# Patient Record
Sex: MALE | Race: BLACK OR AFRICAN AMERICAN
[De-identification: names, ages, dates, MRNs, and addresses within clinical notes are randomized per-mention and may not be internally consistent; named-entity substitution may affect disease eponyms.]

---

## 2018-02-24 ENCOUNTER — HOSPITAL ENCOUNTER (EMERGENCY)
Dept: HOSPITAL 72 - EMR | Age: 58
Discharge: HOME | End: 2018-02-24
Payer: COMMERCIAL

## 2018-02-24 VITALS — SYSTOLIC BLOOD PRESSURE: 131 MMHG | DIASTOLIC BLOOD PRESSURE: 77 MMHG

## 2018-02-24 VITALS — SYSTOLIC BLOOD PRESSURE: 124 MMHG | DIASTOLIC BLOOD PRESSURE: 80 MMHG

## 2018-02-24 VITALS — SYSTOLIC BLOOD PRESSURE: 129 MMHG | DIASTOLIC BLOOD PRESSURE: 79 MMHG

## 2018-02-24 VITALS — WEIGHT: 160 LBS | BODY MASS INDEX: 24.25 KG/M2 | HEIGHT: 68 IN

## 2018-02-24 DIAGNOSIS — K21.9: ICD-10-CM

## 2018-02-24 DIAGNOSIS — M10.9: ICD-10-CM

## 2018-02-24 DIAGNOSIS — R10.13: Primary | ICD-10-CM

## 2018-02-24 LAB
ADD MANUAL DIFF: NO
ALBUMIN SERPL-MCNC: 3.3 G/DL (ref 3.4–5)
ALBUMIN/GLOB SERPL: 0.8 {RATIO} (ref 1–2.7)
ALP SERPL-CCNC: 62 U/L (ref 46–116)
ALT SERPL-CCNC: 23 U/L (ref 12–78)
ANION GAP SERPL CALC-SCNC: 8 MMOL/L (ref 5–15)
APPEARANCE UR: CLEAR
APTT BLD: 27 SEC (ref 23–33)
APTT PPP: (no result) S
AST SERPL-CCNC: 14 U/L (ref 15–37)
BASOPHILS NFR BLD AUTO: 1.1 % (ref 0–2)
BILIRUB SERPL-MCNC: 0.7 MG/DL (ref 0.2–1)
BUN SERPL-MCNC: 14 MG/DL (ref 7–18)
CALCIUM SERPL-MCNC: 9.2 MG/DL (ref 8.5–10.1)
CHLORIDE SERPL-SCNC: 107 MMOL/L (ref 98–107)
CK SERPL-CCNC: 111 U/L (ref 26–308)
CO2 SERPL-SCNC: 25 MMOL/L (ref 21–32)
CREAT SERPL-MCNC: 1.3 MG/DL (ref 0.55–1.3)
EOSINOPHIL NFR BLD AUTO: 1.8 % (ref 0–3)
ERYTHROCYTE [DISTWIDTH] IN BLOOD BY AUTOMATED COUNT: 11.5 % (ref 11.6–14.8)
GLOBULIN SER-MCNC: 4.2 G/DL
GLUCOSE UR STRIP-MCNC: NEGATIVE MG/DL
HCT VFR BLD CALC: 46.4 % (ref 42–52)
HGB BLD-MCNC: 15.8 G/DL (ref 14.2–18)
INR PPP: 1 (ref 0.9–1.1)
KETONES UR QL STRIP: NEGATIVE
LEUKOCYTE ESTERASE UR QL STRIP: NEGATIVE
LYMPHOCYTES NFR BLD AUTO: 50.4 % (ref 20–45)
MCV RBC AUTO: 92 FL (ref 80–99)
MONOCYTES NFR BLD AUTO: 3 % (ref 1–10)
NEUTROPHILS NFR BLD AUTO: 43.7 % (ref 45–75)
NITRITE UR QL STRIP: NEGATIVE
PH UR STRIP: 5 [PH] (ref 4.5–8)
PLATELET # BLD: 205 K/UL (ref 150–450)
POTASSIUM SERPL-SCNC: 3.6 MMOL/L (ref 3.5–5.1)
PROT UR QL STRIP: NEGATIVE
RBC # BLD AUTO: 5.02 M/UL (ref 4.7–6.1)
SODIUM SERPL-SCNC: 140 MMOL/L (ref 136–145)
SP GR UR STRIP: 1.01 (ref 1–1.03)
UROBILINOGEN UR-MCNC: NORMAL MG/DL (ref 0–1)
WBC # BLD AUTO: 6.1 K/UL (ref 4.8–10.8)

## 2018-02-24 PROCEDURE — 81003 URINALYSIS AUTO W/O SCOPE: CPT

## 2018-02-24 PROCEDURE — 83690 ASSAY OF LIPASE: CPT

## 2018-02-24 PROCEDURE — 71045 X-RAY EXAM CHEST 1 VIEW: CPT

## 2018-02-24 PROCEDURE — 99284 EMERGENCY DEPT VISIT MOD MDM: CPT

## 2018-02-24 PROCEDURE — 82550 ASSAY OF CK (CPK): CPT

## 2018-02-24 PROCEDURE — 85610 PROTHROMBIN TIME: CPT

## 2018-02-24 PROCEDURE — 93005 ELECTROCARDIOGRAM TRACING: CPT

## 2018-02-24 PROCEDURE — 85025 COMPLETE CBC W/AUTO DIFF WBC: CPT

## 2018-02-24 PROCEDURE — 80053 COMPREHEN METABOLIC PANEL: CPT

## 2018-02-24 PROCEDURE — 96375 TX/PRO/DX INJ NEW DRUG ADDON: CPT

## 2018-02-24 PROCEDURE — 96374 THER/PROPH/DIAG INJ IV PUSH: CPT

## 2018-02-24 PROCEDURE — 36415 COLL VENOUS BLD VENIPUNCTURE: CPT

## 2018-02-24 PROCEDURE — 85730 THROMBOPLASTIN TIME PARTIAL: CPT

## 2018-02-24 PROCEDURE — 74018 RADEX ABDOMEN 1 VIEW: CPT

## 2018-02-24 PROCEDURE — 84484 ASSAY OF TROPONIN QUANT: CPT

## 2018-02-24 NOTE — EMERGENCY ROOM REPORT
History of Present Illness


General


Chief Complaint:  Abdominal Pain


Source:  Patient, Family Member, EMS





Present Illness


HPI


The patient presents with epigastric pain which began this morning.  This is 

severe - rated 6/10, constant.  Denies alcohol and nonsteroidal anti-

inflammatories.  He does take medication for gout and supposed to take medicine 

for GERD.  Denies any vomiting or melena.  The pain does not radiate to his 

back.  It is epigastric.  Burning pain severe pressure.  Denies fever.





GERD and Gout





Not compliant with omeperazole.  No endoscopy.





No chest pain, cough, dysuria, headache, rashes, change in bowels, joint pain (

gout stable).





No ill contacts.


Allergies:  


Coded Allergies:  


     No Known Allergies (Unverified , 2/24/18)





Patient History


Past Medical History:  see triage record


Social History:  Reports: drug use, Denies: smoking, alcohol use


Social History Narrative





Reviewed Nursing Documentation:  PMH: Agreed, PSxH: Agreed





Nursing Documentation-PMH


Hx Gastrointestinal Problems:  Yes - gastritis





Review of Systems


All Other Systems:  negative except mentioned in HPI





Physical Exam





Vital Signs








  Date Time  Temp Pulse Resp B/P (MAP) Pulse Ox O2 Delivery O2 Flow Rate FiO2


 


2/24/18 11:59 98.0 88 16 124/80 98 Room Air  





 98.1       








Sp02 EP Interpretation:  reviewed, normal


General Appearance:  well appearing, GCS 15, mild distress


Head:  normocephalic, atraumatic


Eyes:  bilateral eye normal inspection, bilateral eye PERRL


ENT:  moist mucus membranes


Neck:  supple


Respiratory:  lungs clear, normal breath sounds


Cardiovascular #1:  regular rate, rhythm


Cardiovascular #2:  2+ radial (R)


Gastrointestinal:  normal inspection, normal bowel sounds, no mass, non-

distended, no guarding, no rebound, tenderness - epigastric


Genitourinary:  no CVA tenderness


Musculoskeletal:  back normal, gait/station normal, normal range of motion


Neurologic:  alert, oriented x3, grossly normal


Psychiatric:  anxious - and in pain (looks worse than6/10)


Skin:  normal inspection, warm/dry





Medical Decision Making


Diagnostic Impression:  


 Primary Impression:  


 Epigastric pain


ER Course


Patient with h/o GERD presents with epigastric pain which is severe.  DDx: 

gastritis, AMI, PUD, pancreatitis, diverticultis amongst others.  Exam against 

PE.  Evaluation with EKG, CXR, Abd films, labs.  Treatment with IV hydration, 

zofran, pepcid and morphine.





EKG without injury, CXR normal, Abd, normal.  Labs with normal WBC, H/H, CMP.





Still with pain after initial dosing - though resting calmly.  Dilaudid ordered.





Significant relief with improved clinical exam (though rated pain same).





Discussed need for follow up with PMD (sooner than appointment).  Also of need 

for GI referral.  Also discussed need for daily omeprazole.





.Patient stable for outpatient observation and treatment.





Laboratory Tests








Test


  2/24/18


12:15 2/24/18


13:04


 


White Blood Count


  6.1 K/UL


(4.8-10.8) 


 


 


Red Blood Count


  5.02 M/UL


(4.70-6.10) 


 


 


Hemoglobin


  15.8 G/DL


(14.2-18.0) 


 


 


Hematocrit


  46.4 %


(42.0-52.0) 


 


 


Mean Corpuscular Volume 92 FL (80-99)   


 


Mean Corpuscular Hemoglobin


  31.5 PG


(27.0-31.0)  H 


 


 


Mean Corpuscular Hemoglobin


Concent 34.2 G/DL


(32.0-36.0) 


 


 


Red Cell Distribution Width


  11.5 %


(11.6-14.8)  L 


 


 


Platelet Count


  205 K/UL


(150-450) 


 


 


Mean Platelet Volume


  6.0 FL


(6.5-10.1)  L 


 


 


Neutrophils (%) (Auto)


  43.7 %


(45.0-75.0)  L 


 


 


Lymphocytes (%) (Auto)


  50.4 %


(20.0-45.0)  H 


 


 


Monocytes (%) (Auto)


  3.0 %


(1.0-10.0) 


 


 


Eosinophils (%) (Auto)


  1.8 %


(0.0-3.0) 


 


 


Basophils (%) (Auto)


  1.1 %


(0.0-2.0) 


 


 


Prothrombin Time


  10.0 SEC


(9.30-11.50) 


 


 


Prothrombin Time INR 1.0 (0.9-1.1)   


 


PTT


  27 SEC (23-33)


  


 


 


Sodium Level


  140 MMOL/L


(136-145) 


 


 


Potassium Level


  3.6 MMOL/L


(3.5-5.1) 


 


 


Chloride Level


  107 MMOL/L


() 


 


 


Carbon Dioxide Level


  25 MMOL/L


(21-32) 


 


 


Anion Gap


  8 mmol/L


(5-15) 


 


 


Blood Urea Nitrogen


  14 mg/dL


(7-18) 


 


 


Creatinine


  1.3 MG/DL


(0.55-1.30) 


 


 


Estimate Glomerular


Filtration Rate 56.7 mL/min


(>60) 


 


 


Glucose Level


  151 MG/DL


()  H 


 


 


Calcium Level


  9.2 MG/DL


(8.5-10.1) 


 


 


Total Bilirubin


  0.7 MG/DL


(0.2-1.0) 


 


 


Aspartate Amino Transferase


(AST) 14 U/L (15-37)


L 


 


 


Alanine Aminotransferase (ALT)


  23 U/L (12-78)


  


 


 


Alkaline Phosphatase


  62 U/L


() 


 


 


Total Creatine Kinase


  111 U/L


() 


 


 


Troponin I


  0.000 ng/mL


(0.000-0.056) 


 


 


Total Protein


  7.5 G/DL


(6.4-8.2) 


 


 


Albumin


  3.3 G/DL


(3.4-5.0)  L 


 


 


Globulin 4.2 g/dL   


 


Albumin/Globulin Ratio


  0.8 (1.0-2.7)


L 


 


 


Lipase


  196 U/L


() 


 


 


Urine Color  Pale yellow  


 


Urine Appearance  Clear  


 


Urine pH  5 (4.5-8.0)  


 


Urine Specific Gravity


  


  1.015


(1.005-1.035)


 


Urine Protein


  


  Negative


(NEGATIVE)


 


Urine Glucose (UA)


  


  Negative


(NEGATIVE)


 


Urine Ketones


  


  Negative


(NEGATIVE)


 


Urine Occult Blood


  


  Negative


(NEGATIVE)


 


Urine Nitrite


  


  Negative


(NEGATIVE)


 


Urine Bilirubin


  


  Negative


(NEGATIVE)


 


Urine Urobilinogen


  


  Normal MG/DL


(0.0-1.0)


 


Urine Leukocyte Esterase


  


  Negative


(NEGATIVE)








EKG Diagnostic Results


Rate:  normal


Rhythm:  NSR


ST Segments:  no acute changes





Rhythm Strip Diag. Results


EP Interpretation:  yes


Rhythm:  NSR, no PVC's, no ectopy





Chest X-Ray Diagnostic Results


Chest X-Ray Diagnostic Results #1:  


   Chest X-Ray Ordered:  Yes


   # of Views/Limited/Complete:  1 View


   Indication:  Other


   EP Interpretation:  Yes


   Interpretation:  no consolidation, no effusion, no pneumothorax


   Impression:  No acute disease


   Electronically Signed by:  Jacek Lynn MD


Chest X-Ray Diagnostic Results #2:  


   Chest X-Ray Ordered:  Yes


   # of Views/Limited/Complete:  1 View


   Indication:  Other


   Interpretation:  no consolidation, no effusion, no pneumothorax


   Impression:  No acute disease


   Electronically Signed by:  Jacek Lynn MD





Other X-Ray Diagnostic Results


Other X-Ray Diagnostic Results :  


   # of Views/Limited Vs Complete:  1 View


   Indication:  Pain


   EP Interpretation:  Yes


   Interpretation:  nonspecific bowel gas, no sbo, other - No masses - 

calcifications


   Impression:  No acute disease


   Electronically Signed by:  Jacek Lynn MD





Last Vital Signs








  Date Time  Temp Pulse Resp B/P (MAP) Pulse Ox O2 Delivery O2 Flow Rate FiO2


 


2/24/18 16:16 97.8  16 129/79 98 Room Air  





 97.8       


 


2/24/18 11:59  88      








Status:  improved


Disposition:  HOME, SELF-CARE


Condition:  Improved


Scripts


Mag Hydrox/Al Hydrox/Simeth (MAALOX MAXIMUM STRENGTH SUSP) 355 Ml Oral.susp


30 ML PO Q6HR, #240 ML


   Prov: Jacek Lynn M.D.         2/24/18 


Hydrocodone Bit/Acetaminophen 5-325* (NORCO 5-325*) 1 Each Tablet


1 TAB ORAL Q6H Y for For Pain, #20 TAB 0 Refills


   Prov: Jacek Lynn M.D.         2/24/18


Referrals:  


ANA LEA,REFERRING (PCP)











Jacek Lynn M.D. Feb 24, 2018 16:01

## 2018-02-25 NOTE — DIAGNOSTIC IMAGING REPORT
Indication: Reason For Exam: ABD PAIN.

 

Technique: One view abdomen

 

Findings: The bowel gas pattern is nonobstructive. There is no free fluid. No gross

free air. Degenerative changes are noted in the spine.

 

Impression: Negative one view abdomen..

## 2018-02-25 NOTE — CARDIOLOGY REPORT
--------------- APPROVED REPORT --------------





EKG Measurement

Heart [id number]

[id number]

[id number]

[id number]

[id number]





Sinus rhythm with 1st degree AV block

Otherwise normal ECG

## 2018-02-25 NOTE — DIAGNOSTIC IMAGING REPORT
Indication: SOB

 

Technique: XRAY Chest 1v

 

Comparison: None.

 

Findings: The cardiomediastinal silhouette is normal. The lungs are clear. There is

no evidence of pleural fluid. The bones are unremarkable.

 

Impression:

 

Normal chest.